# Patient Record
Sex: MALE
[De-identification: names, ages, dates, MRNs, and addresses within clinical notes are randomized per-mention and may not be internally consistent; named-entity substitution may affect disease eponyms.]

---

## 2021-10-27 PROBLEM — Z00.00 ENCOUNTER FOR PREVENTIVE HEALTH EXAMINATION: Status: ACTIVE | Noted: 2021-10-27

## 2021-11-22 ENCOUNTER — APPOINTMENT (OUTPATIENT)
Dept: UROLOGY | Facility: CLINIC | Age: 71
End: 2021-11-22
Payer: MEDICARE

## 2021-11-22 VITALS — DIASTOLIC BLOOD PRESSURE: 73 MMHG | TEMPERATURE: 98.2 F | HEART RATE: 81 BPM | SYSTOLIC BLOOD PRESSURE: 132 MMHG

## 2021-11-22 DIAGNOSIS — Z82.49 FAMILY HISTORY OF ISCHEMIC HEART DISEASE AND OTHER DISEASES OF THE CIRCULATORY SYSTEM: ICD-10-CM

## 2021-11-22 DIAGNOSIS — Z63.4 DISAPPEARANCE AND DEATH OF FAMILY MEMBER: ICD-10-CM

## 2021-11-22 PROCEDURE — 99204 OFFICE O/P NEW MOD 45 MIN: CPT

## 2021-11-22 RX ORDER — DOXYLAMINE SUCCINATE 25 MG
TABLET ORAL
Refills: 0 | Status: ACTIVE | COMMUNITY

## 2021-11-22 RX ORDER — TADALAFIL 5 MG/1
5 TABLET ORAL
Qty: 100 | Refills: 0 | Status: ACTIVE | COMMUNITY
Start: 2021-06-11

## 2021-11-22 SDOH — SOCIAL STABILITY - SOCIAL INSECURITY: DISSAPEARANCE AND DEATH OF FAMILY MEMBER: Z63.4

## 2021-11-23 PROBLEM — Z63.4 WIDOWED: Status: ACTIVE | Noted: 2021-11-22

## 2021-11-23 PROBLEM — Z82.49 FAMILY HISTORY OF HYPERTENSION: Status: ACTIVE | Noted: 2021-11-22

## 2021-11-23 LAB
APPEARANCE: CLEAR
BACTERIA: NEGATIVE
BILIRUBIN URINE: NEGATIVE
BLOOD URINE: NEGATIVE
COLOR: NORMAL
GLUCOSE QUALITATIVE U: NEGATIVE
HYALINE CASTS: 0 /LPF
KETONES URINE: NEGATIVE
LEUKOCYTE ESTERASE URINE: NEGATIVE
MICROSCOPIC-UA: NORMAL
NITRITE URINE: NEGATIVE
PH URINE: 5.5
PROTEIN URINE: NEGATIVE
RED BLOOD CELLS URINE: 1 /HPF
SPECIFIC GRAVITY URINE: 1.01
SQUAMOUS EPITHELIAL CELLS: 0 /HPF
UROBILINOGEN URINE: NORMAL
WHITE BLOOD CELLS URINE: 0 /HPF

## 2021-11-23 NOTE — ASSESSMENT
[FreeTextEntry1] : 70 yo male with BPH with worsening LUTS who has failed medical therapy (flomax, alfuzosin, avodart). Normal PSA, no prior MRI or biopsy, neg FH, neg MARY. He has strong interest in PAE. \par \par 1. Trial doxazosin 2mg\par 2. UA, UCx\par 3. Restrict fluid 4 hours before bed\par 4. Nocturia- complete voiding diary. If making >1/3 UOP at night, r/o JULIA\par 5. Advised to stop sleep aid as this can lead to retention\par 6. MRI to assess anatomy before PAE\par \par Thank you very much for allowing me to assist in the care of this patient. Should you have any additional questions or concerns please do not hesitate to contact me.\par \par \par Sincerely,\par \par \par Lucas Hernandez D.O.\par  of Urology and Radiology\par  of Urology at [facility]\par System Director for Prostate Cancer\par [address] [place] [address], 5th Floor [facility], [facility] [place]\par Phone: [phone]\par

## 2021-11-23 NOTE — PHYSICAL EXAM
[Urethral Meatus] : meatus normal [Penis Abnormality] : normal circumcised penis [Urinary Bladder Findings] : the bladder was normal on palpation [Scrotum] : the scrotum was normal [Testes Tenderness] : no tenderness of the testes [Testes Mass (___cm)] : there were no testicular masses [Prostate Tenderness] : the prostate was not tender [No Prostate Nodules] : no prostate nodules [Prostate Size ___ gm] : prostate size [unfilled] gm [General Appearance - Well Developed] : well developed [General Appearance - Well Nourished] : well nourished [Normal Appearance] : normal appearance [Well Groomed] : well groomed [General Appearance - In No Acute Distress] : no acute distress [Edema] : no peripheral edema [Respiration, Rhythm And Depth] : normal respiratory rhythm and effort [Exaggerated Use Of Accessory Muscles For Inspiration] : no accessory muscle use [Abdomen Soft] : soft [Abdomen Tenderness] : non-tender [Abdomen Hernia] : no hernia was discovered [Costovertebral Angle Tenderness] : no ~M costovertebral angle tenderness [Normal Station and Gait] : the gait and station were normal for the patient's age [] : no rash [No Focal Deficits] : no focal deficits [Oriented To Time, Place, And Person] : oriented to person, place, and time [Affect] : the affect was normal [Mood] : the mood was normal [Not Anxious] : not anxious [No Palpable Adenopathy] : no palpable adenopathy

## 2021-11-23 NOTE — HISTORY OF PRESENT ILLNESS
[FreeTextEntry1] : Dear Dr. Lucio Bright\par \par Thank you so much for the referral to help care for your patient.\par \par \par Chief Complaint: BPH\par Date of first visit: 11/22/2021\par Occupation: Retired  [employer]\par  \par \par STAR DUONG  is a 71 year old  man with no PMHx who presents for BPH/PAE consult. His BPH symptoms have been occurring x9 years. He has failed medical therapy (flomax, uroxatral, avodart). "They weren't doing anything." He reports a strong stream but does experience urgency with occasional urge incontinence (does not wear pads), nocturia x3, and incomplete emptying. He takes a sleep aid to try to avoid waking up at night. His biggest complaint is nocturia. No hx of recent/recurrent infection, bladder stones, hematuria, dysuria.\par \par His PSA on 6/3/21 was 0.3 ng/ml, no prior biopsy or prostate MRI and denies family hx of  malignancies. \par \par 1/6/20 Cystoscopy: demonstrated median lobe \par \par PSA Hx\par 0.3  6/3/21\par 0.5 9/26/2019\par 0.31 9/29/2007\par \par 11/22/2021\par IPSS 13 QOL 5\par JG NR\par Flow/PVR: vv 20ml not valid. PVR 193ml\par \par The patient denies fevers, chills, nausea and or vomiting and no unexplained weight loss.\par \par All pertinent laboratory, films and physician notes were reviewed.  Questionnaire results were discussed with patient.

## 2021-11-24 LAB — BACTERIA UR CULT: NORMAL

## 2021-12-08 ENCOUNTER — TRANSCRIPTION ENCOUNTER (OUTPATIENT)
Age: 71
End: 2021-12-08

## 2022-01-24 ENCOUNTER — APPOINTMENT (OUTPATIENT)
Dept: UROLOGY | Facility: CLINIC | Age: 72
End: 2022-01-24

## 2022-01-31 ENCOUNTER — APPOINTMENT (OUTPATIENT)
Dept: UROLOGY | Facility: CLINIC | Age: 72
End: 2022-01-31
Payer: MEDICARE

## 2022-01-31 VITALS
TEMPERATURE: 98.6 F | SYSTOLIC BLOOD PRESSURE: 133 MMHG | WEIGHT: 200 LBS | BODY MASS INDEX: 26.51 KG/M2 | HEIGHT: 73 IN | DIASTOLIC BLOOD PRESSURE: 79 MMHG | HEART RATE: 78 BPM

## 2022-01-31 DIAGNOSIS — N40.1 BENIGN PROSTATIC HYPERPLASIA WITH LOWER URINARY TRACT SYMPMS: ICD-10-CM

## 2022-01-31 DIAGNOSIS — N13.8 BENIGN PROSTATIC HYPERPLASIA WITH LOWER URINARY TRACT SYMPMS: ICD-10-CM

## 2022-01-31 PROCEDURE — 99214 OFFICE O/P EST MOD 30 MIN: CPT

## 2022-01-31 NOTE — ASSESSMENT
[FreeTextEntry1] : 72 yo male with BPH with worsening LUTS who has failed medical therapy (flomax, alfuzosin, avodart). Normal \par PSA, no prior MRI or biopsy, neg FH, neg MARY. \par \par Discussed REZUM, TURP, PAE\par \par Because the patient Faile medical management, we discussed the different surgical options: REZUM.  KTP (Greenlight) laser, Standard TURP, and Button TURP. We discussed the risks including bleeding, infection, damage to the urethra, bladder and ureteral orifices, scar tissue, and leaking (either due to injuring the sphincter or lowering bladder outlet obstruction with concomitant DO). The patient is not planning on having any additional children and also understands that retrograde ejaculation is also a risk of this procedure. This can be permanent. All TURP procedures can cause short or long term dysuria and intermittent hematuria. \par \par The patient understands that some of these complications are reversible while some are not and may require additional procedures. He understands that the prostate grows throughout life and this procedure does not guarantee that he will not need a future procedure.\par \par He will check with Dr. Bright to do REZUM.  He did initially recommend TURP which would have fixed the problem.  REZUM has less REJ.\par \par 1. Trial doxazosin 2mg - can increase to 4 mg.\par 2. Restrict fluid 4 hours before bed (didn’t comply well)\par 3. Nocturia / Voiding Diary completed < 1/3 of UO at night (therefore not polyuria)\par 4. Advised to stop sleep aid as this can lead to retention\par \par Thank you very much for allowing me to assist in the care of this patient. Should you have any additional questions or concerns please do not hesitate to contact me.\par \par \par Sincerely,\par \par \par Lucas Hernandez D.O.\par  of Urology and Radiology\par  of Urology at [facility]\par System Director for Prostate Cancer\par [address] [place] [address], 5th Floor [facility], [address]\par Phone: [phone]\par

## 2022-01-31 NOTE — HISTORY OF PRESENT ILLNESS
[FreeTextEntry1] : Dear Dr. Lucio Bright\par \par Thank you so much for the referral to help care for your patient.\par \par Chief Complaint: BPH\par Date of first visit: 11/22/2021\par Occupation: Retired  [employer]\par \par STAR DUONG  is a 71 year old  man with no PMHx who presents for BPH/PAE consult. His BPH symptoms have been occurring x9 years. He has failed medical therapy (flomax, uroxatral, avodart). "They weren't doing anything." He reports a strong stream but does experience urgency with occasional urge incontinence (does not wear pads), nocturia x3, and incomplete emptying. He takes a sleep aid to try to avoid waking up at night. His biggest complaint is nocturia. No hx of recent/recurrent infection, bladder stones, hematuria, dysuria.\par \par The MRI demonstrated a large median lobe.  Most likely explaining why he is refractory to treatments.  \par \par His PSA on 6/3/21 was 0.3 ng/ml. MRI on 1/14/22 was negative for suspicious prostate lesions. His PSA density is 0.005 ng/ml/cc. He has never had a prostate biopsy and denies family hx of  malignancies. \par \par 1/6/20 Cystoscopy: demonstrated median lobe \par \par PSA Hx\par 0.3  6/3/21\par 0.5 9/26/2019\par 0.31 9/29/2007\par \par MRI Hx\par MRI at [facility] on 1/14/22. 53cc prostate with no suspicious prostate lesion, PIRADS 2. No LAD No EPE, No Bony Lesions.  The images have been reviewed and clinical implications discussed with the patient.\par \par 01/24/2022\par IPSS 12  QOL 5\par JG NR\par Flow/PVR:  cc could not do a flow.\par \par 11/22/2021\par IPSS 13 QOL 5\par JG NR\par Flow/PVR: vv 20ml not valid. PVR 193ml\par \par The patient denies fevers, chills, nausea and or vomiting and no unexplained weight loss.\par \par All pertinent laboratory, films and physician notes were reviewed.  Questionnaire results were discussed with patient.

## 2022-02-22 ENCOUNTER — RX RENEWAL (OUTPATIENT)
Age: 72
End: 2022-02-22

## 2022-05-23 ENCOUNTER — RX RENEWAL (OUTPATIENT)
Age: 72
End: 2022-05-23

## 2022-05-23 RX ORDER — DOXAZOSIN 2 MG/1
2 TABLET ORAL
Qty: 90 | Refills: 0 | Status: ACTIVE | COMMUNITY
Start: 2021-11-22 | End: 1900-01-01